# Patient Record
Sex: MALE | Race: ASIAN | NOT HISPANIC OR LATINO | URBAN - METROPOLITAN AREA
[De-identification: names, ages, dates, MRNs, and addresses within clinical notes are randomized per-mention and may not be internally consistent; named-entity substitution may affect disease eponyms.]

---

## 2019-06-26 PROBLEM — Z00.00 ENCOUNTER FOR PREVENTIVE HEALTH EXAMINATION: Status: ACTIVE | Noted: 2019-06-26

## 2019-07-15 ENCOUNTER — OUTPATIENT (OUTPATIENT)
Dept: OUTPATIENT SERVICES | Facility: HOSPITAL | Age: 64
LOS: 1 days | End: 2019-07-15

## 2019-07-15 ENCOUNTER — APPOINTMENT (OUTPATIENT)
Dept: CV DIAGNOSITCS | Facility: HOSPITAL | Age: 64
End: 2019-07-15
Payer: MEDICAID

## 2019-07-15 DIAGNOSIS — I35.9 NONRHEUMATIC AORTIC VALVE DISORDER, UNSPECIFIED: ICD-10-CM

## 2019-07-15 PROCEDURE — 93312 ECHO TRANSESOPHAGEAL: CPT | Mod: 26

## 2019-07-15 PROCEDURE — 93320 DOPPLER ECHO COMPLETE: CPT | Mod: 26,GC

## 2019-07-15 PROCEDURE — 93325 DOPPLER ECHO COLOR FLOW MAPG: CPT | Mod: 26,GC

## 2019-07-15 RX ORDER — SODIUM CHLORIDE 9 MG/ML
3 INJECTION INTRAMUSCULAR; INTRAVENOUS; SUBCUTANEOUS ONCE
Refills: 0 | Status: DISCONTINUED | OUTPATIENT
Start: 2019-07-15 | End: 2019-07-30

## 2019-07-18 PROBLEM — Z86.73 HISTORY OF CEREBROVASCULAR ACCIDENT: Status: RESOLVED | Noted: 2019-07-18 | Resolved: 2019-07-18

## 2019-07-18 PROBLEM — F17.200 CURRENT EVERY DAY SMOKER: Status: ACTIVE | Noted: 2019-07-18

## 2019-07-18 PROBLEM — R01.1 HEART MURMUR: Status: ACTIVE | Noted: 2019-07-18

## 2019-07-18 RX ORDER — ACETAMINOPHEN 325 MG/1
325 TABLET ORAL EVERY 6 HOURS
Refills: 0 | Status: ACTIVE | COMMUNITY
Start: 2019-07-18

## 2019-07-18 RX ORDER — METOPROLOL SUCCINATE 25 MG/1
25 TABLET, EXTENDED RELEASE ORAL DAILY
Qty: 90 | Refills: 0 | Status: ACTIVE | COMMUNITY
Start: 2019-07-18

## 2019-07-18 RX ORDER — OMEPRAZOLE 20 MG/1
20 CAPSULE, DELAYED RELEASE ORAL
Refills: 0 | Status: ACTIVE | COMMUNITY
Start: 2019-07-18

## 2019-07-18 RX ORDER — PROMETHAZINE HYDROCHLORIDE AND DEXTROMETHORPHAN HYDROBROMIDE ORAL SOLUTION 15; 6.25 MG/5ML; MG/5ML
6.25-15 SOLUTION ORAL
Qty: 240 | Refills: 2 | Status: ACTIVE | COMMUNITY
Start: 2019-07-18

## 2019-07-22 ENCOUNTER — APPOINTMENT (OUTPATIENT)
Dept: CARDIOTHORACIC SURGERY | Facility: CLINIC | Age: 64
End: 2019-07-22
Payer: COMMERCIAL

## 2019-07-22 ENCOUNTER — NON-APPOINTMENT (OUTPATIENT)
Age: 64
End: 2019-07-22

## 2019-07-22 VITALS
HEIGHT: 66 IN | OXYGEN SATURATION: 98 % | SYSTOLIC BLOOD PRESSURE: 127 MMHG | WEIGHT: 160 LBS | TEMPERATURE: 98.2 F | RESPIRATION RATE: 16 BRPM | BODY MASS INDEX: 25.71 KG/M2 | HEART RATE: 60 BPM | DIASTOLIC BLOOD PRESSURE: 73 MMHG

## 2019-07-22 DIAGNOSIS — F17.200 NICOTINE DEPENDENCE, UNSPECIFIED, UNCOMPLICATED: ICD-10-CM

## 2019-07-22 DIAGNOSIS — I35.1 NONRHEUMATIC AORTIC (VALVE) INSUFFICIENCY: ICD-10-CM

## 2019-07-22 DIAGNOSIS — R01.1 CARDIAC MURMUR, UNSPECIFIED: ICD-10-CM

## 2019-07-22 DIAGNOSIS — I35.2 NONRHEUMATIC AORTIC (VALVE) STENOSIS WITH INSUFFICIENCY: ICD-10-CM

## 2019-07-22 DIAGNOSIS — Q23.1 CONGENITAL STENOSIS OF AORTIC VALVE: ICD-10-CM

## 2019-07-22 DIAGNOSIS — Z86.73 PERSONAL HISTORY OF TRANSIENT ISCHEMIC ATTACK (TIA), AND CEREBRAL INFARCTION W/OUT RESIDUAL DEFICITS: ICD-10-CM

## 2019-07-22 DIAGNOSIS — I10 ESSENTIAL (PRIMARY) HYPERTENSION: ICD-10-CM

## 2019-07-22 DIAGNOSIS — I63.9 CEREBRAL INFARCTION, UNSPECIFIED: ICD-10-CM

## 2019-07-22 DIAGNOSIS — I35.8 OTHER NONRHEUMATIC AORTIC VALVE DISORDERS: ICD-10-CM

## 2019-07-22 DIAGNOSIS — Q23.0 CONGENITAL STENOSIS OF AORTIC VALVE: ICD-10-CM

## 2019-07-22 PROCEDURE — ZZZZZ: CPT

## 2019-07-22 PROCEDURE — 99205 OFFICE O/P NEW HI 60 MIN: CPT

## 2019-07-22 PROCEDURE — 93000 ELECTROCARDIOGRAM COMPLETE: CPT

## 2019-07-22 NOTE — PHYSICAL EXAM
[Normal Appearance] : normal appearance [General Appearance - Well Developed] : well developed [General Appearance - Well Nourished] : well nourished [Well Groomed] : well groomed [Eyelids - No Xanthelasma] : the eyelids demonstrated no xanthelasmas [General Appearance - In No Acute Distress] : no acute distress [No Oral Cyanosis] : no oral cyanosis [No Oral Pallor] : no oral pallor [Normal Jugular Venous V Waves Present] : normal jugular venous V waves present [Normal Rate] : normal [No Jugular Venous Graham A Waves] : no jugular venous graham A waves [IV] : a grade 4 [Rhythm Regular] : regular [II] : a grade 2 [No Pitting Edema] : no pitting edema present [Exaggerated Use Of Accessory Muscles For Inspiration] : no accessory muscle use [Respiration, Rhythm And Depth] : normal respiratory rhythm and effort [Bowel Sounds] : normal bowel sounds [Abdomen Soft] : soft [Abdomen Tenderness] : non-tender [Abnormal Walk] : normal gait [Nail Clubbing] : no clubbing of the fingernails [Cyanosis, Localized] : no localized cyanosis [Petechial Hemorrhages (___cm)] : no petechial hemorrhages [] : no rash [Skin Color & Pigmentation] : normal skin color and pigmentation [No Venous Stasis] : no venous stasis [Oriented To Time, Place, And Person] : oriented to person, place, and time [Affect] : the affect was normal [No Anxiety] : not feeling anxious [FreeTextEntry1] : via  [Mood] : the mood was normal

## 2019-07-22 NOTE — DISCUSSION/SUMMARY
[Deteriorating] : deteriorating [Aortic Stenosis] : aortic stenosis [Cardiac Catheterization] : cardiac catheterization [Coronary Angiography] : coronary angiography [Cardiothoracic Surgery Consult] : cardiothoracic surgery consultation [Patient] : the patient [None] : none [Aortic Valve Replacement] : aortic valve replacement [FreeTextEntry1] : Mr Torres has a bicuspid aortic valve (as reported on a recent JOSSELYN) with severe stenosis and insufficiency and recently progressive exertional dyspnea (NYHA II).  He is referred for evaluation of treatment options, specifically TAVR vs. SAVR, however as I have explained to him and his family in extensive detail he would not be a candidate for the LOW RISK BICUSPID TAVR study--the lower limit inclusion for age is 65.  I am recommending he see Dr Stephenson to discuss consideration for SAVR given his symptoms.  He would like to proceed with scheduling his right and left cardiac catheterization, which will be scheduled.  Given his history of HTN (though well controlled), AS/AI, and LVH, I would recommend consideration for adding a low dose ACE or ARB--which they will discuss with Dr Turcios.  I would also recommend a baseline Pulmonary evaluation given his 40+ pack year smoking history. [Family] : the patient's family

## 2019-07-22 NOTE — HISTORY OF PRESENT ILLNESS
[FreeTextEntry1] : Mr Torres is referred by Dr Turcios for evaluation of a bicuspid aortic valve with mixed disease, both stenosis and insufficiency.  His native language is Greek and he is accompanied by his son and daughter in law.  The AS/AI was just recently diagnosed after he was seen by Dr Turcios for initial consultation (he was taking care of his wife, and the family wanted him to be seen, but were unaware of any prior cardiac issues).  He does have some fatigue, exertional dyspnea, and a decline in functional capacity.  He had a stroke in [facility] a year ago that they attributed to him missing his HTN medications, working, and was severely hypertensive at that time.  He presented with acute visual loss which has since resolved.  He has no angina or syncopal symptoms.  He is treated for HTN and HLD (but does not know his precise medications).  He is a current half-pack a day smoker for the past 40 years; he has never seen a Pulmonologist.

## 2019-07-22 NOTE — REVIEW OF SYSTEMS
[Chills] : no chills [Feeling Fatigued] : feeling fatigued [Fever] : no fever [Eyeglasses] : currently wearing eyeglasses [Dyspnea on exertion] : dyspnea during exertion [Palpitations] : no palpitations [Chest Pain] : no chest pain [see HPI] : see HPI [Cough] : cough [Negative] : Heme/Lymph

## 2019-07-23 NOTE — PHYSICAL EXAM
[General Appearance - Well Developed] : well developed [General Appearance - Well Nourished] : well nourished [General Appearance - Alert] : alert [General Appearance - In No Acute Distress] : in no acute distress [Sclera] : the sclera and conjunctiva were normal [PERRL With Normal Accommodation] : pupils were equal in size, round, and reactive to light [Hearing Threshold Finger Rub Not Winn] : hearing was normal [Both Tympanic Membranes Were Examined] : both tympanic membranes were normal [Outer Ear] : the ears and nose were normal in appearance [Neck Appearance] : the appearance of the neck was normal [] : no respiratory distress [Respiration, Rhythm And Depth] : normal respiratory rhythm and effort [Heart Rate And Rhythm] : heart rate was normal and rhythm regular [Apical Impulse] : the apical impulse was normal [Auscultation Breath Sounds / Voice Sounds] : lungs were clear to auscultation bilaterally [Systolic grade ___/6] : A grade [unfilled]/6 systolic murmur was heard. [Heart Sounds] : normal S1 and S2 [Examination Of The Chest] : the chest was normal in appearance [2+] : left 2+ [Breast Appearance] : normal in appearance [Bowel Sounds] : normal bowel sounds [Abdomen Soft] : soft [Abnormal Walk] : normal gait [No CVA Tenderness] : no ~M costovertebral angle tenderness [Involuntary Movements] : no involuntary movements were seen [Skin Turgor] : normal skin turgor [Skin Color & Pigmentation] : normal skin color and pigmentation [Oriented To Time, Place, And Person] : oriented to person, place, and time [Impaired Insight] : insight and judgment were intact [No Focal Deficits] : no focal deficits [Affect] : the affect was normal [Mood] : the mood was normal [Memory Recent] : recent memory was not impaired [Memory Remote] : remote memory was not impaired [FreeTextEntry1] : Deferred

## 2019-07-23 NOTE — DATA REVIEWED
[FreeTextEntry1] : 7/15/19 JOSSELYN revealed Mild mitral Regurgitation. Calcified bicuspid aortic valve with decreased opening. RT and LT coronary cusp are fused. Peak trans aortic valve gradient 59 mm hg , mean trans aortic gradient 31 mm hg, estimated valve area 0.8 cm2 , consistent with severe aortic stenosis. Severe aortic regurgitation. \par \par 6/25/19 ECHO revealed Mild mitral Regurgitation. Severe aortic stenosis. Mild to moderate aortic regurgitation . Left ventricular diastolic dysfunction.

## 2019-07-23 NOTE — CONSULT LETTER
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 \par \par We take a multidisciplinary team approach to patient care and consider you, the referring physician, an extension of our team. We will maintain an open line of communication with you throughout your patient's treatment course. \par \par He  is being evaluated for Aortic stenosis. I have reviewed all of the patient's medical records and diagnostic images at the time of his  office consultation. I have enclosed a copy for your records. \par \par 7/15/19 JOSSELYN revealed Mild mitral Regurgitation. Calcified bicuspid aortic valve with decreased opening. RT and LT coronary cusp are fused. Peak trans aortic valve gradient 59 mm hg , mean trans aortic gradient 31 mm hg, estimated valve area 0.8 cm2 , consistent with severe aortic stenosis. Severe aortic regurgitation. \par \par 6/25/19 ECHO revealed Mild mitral Regurgitation. Severe aortic stenosis. Mild to moderate aortic regurgitation . Left ventricular diastolic dysfunction. \par \par I have reviewed the indications for surgery and anatomy of the heart. The patient meets criteria for surgery. I have recommended that the patient is a candidate for a Aortic valve Replacement  . \par \par  I will update you on his perioperative status and disposition upon discharge. \par \par I appreciate the opportunity to care for your patient at the  [facility] based at [place]. If there are any questions or concerns, please call me  at [phone].\par \par \par Sincerely, \par \par \par \par \par Peter Stephenson MD\par Director\par [facility]\par Professor \par Cardiovascular & Thoracic Surgery\par [employer] [employer].\par \par \par [facility]:\par Department of Cardiovascular and Thoracic Surgery\[address], [address]\par Office: [phone]\par Fax: [phone]\par \par [facility]\par  \par Department of Cardiovascular and Thoracic Surgery\[address], [address]\par Phone: [phone]\par Office: [phone]\par \par \par \par \par \par

## 2019-07-23 NOTE — HISTORY OF PRESENT ILLNESS
[FreeTextEntry1] : This is a 64 year old male , Serbian speaking with past medical history of Heart murmur, Current smoker ( 0.5 pack/40 yrs) , CVA 2017( no residuals ), Hypertension and Severe Aortic stenosis with complaints of Dyspnea on Exertion for 2 years ( NYHA II ) which is progressively getting worse and occasional palpitations. He went for a routine physical check with the Cardiologist and ECHO revealed Mild mitral Regurgitation. Severe aortic stenosis. Mild to moderate aortic regurgitation . Left ventricular diastolic dysfunction. 7/15/19 JOSSELYN revealed Mild mitral Regurgitation. Calcified bicuspid aortic valve with decreased opening. RT and LT coronary cusp are fused. Peak trans aortic valve gradient 59 mm hg , mean trans aortic gradient 31 mm hg, estimated valve area 0.8 cm2 , consistent with severe aortic stenosis. Severe aortic regurgitation. He is here for surgical consultation and management for Aortic stenosis. Denies any chest pain, dizziness or pedal edema. \par \par

## 2019-07-23 NOTE — ASSESSMENT
[FreeTextEntry1] : This is a 64 year old male , Danish speaking with past medical history of Heart murmur, Current smoker ( 0.5 pack/40 yrs) , CVA 2017( no residuals ), Hypertension and Severe Aortic stenosis with complaints of Dyspnea on Exertion for 2 years ( NYHA II ) which is progressively getting worse and occasional palpitations. He went for a routine physical check with the Cardiologist and ECHO revealed Mild mitral Regurgitation. Severe aortic stenosis. Mild to moderate aortic regurgitation . Left ventricular diastolic dysfunction. 7/15/19 JOSSELYN revealed Mild mitral Regurgitation. Calcified bicuspid aortic valve with decreased opening. RT and LT coronary cusp are fused. Peak trans aortic valve gradient 59 mm hg , mean trans aortic gradient 31 mm hg, estimated valve area 0.8 cm2 , consistent with severe aortic stenosis. Severe aortic regurgitation. He is here for surgical consultation and management for Aortic stenosis. Denies any chest pain, dizziness or pedal edema. \par \par \par  I reviewed the cardiac imaging, medical records and reports with patient and discussed the case. 7/15/19 JOSSELYN revealed Mild mitral Regurgitation. Calcified bicuspid aortic valve with decreased opening. RT and LT coronary cusp are fused. Peak trans aortic valve gradient 59 mm hg , mean trans aortic gradient 31 mm hg, estimated valve area 0.8 cm2 , consistent with severe aortic stenosis. Severe aortic regurgitation. \par \par 6/25/19 ECHO revealed Mild mitral Regurgitation. Severe aortic stenosis. Mild to moderate aortic regurgitation . Left ventricular diastolic dysfunction. \par \par  I discussed the risks , benefits and alternatives to surgery. Risks included but not limited to  bleeding , stroke, Myocardial Infarction, kidney problems,Blood transfusion ,permanent  pacemaker implantation,  infections and death. I  quoted an  operative mortality and complication risks as Low . I also discussed the various approaches in detail.I  feel that the patient will benefit and is a candidate for a Aortic valve Replacement  . All questions and concerns were addressed and patient will decide and call back with decision. \par \par \par Plan:\par 1) Aortic valve Replacement  \par 2) Cardiac Catherization to evaluate coronary arteries\par 3) May return to clinic on PRN basis\par

## 2019-07-23 NOTE — REVIEW OF SYSTEMS
[Palpitations] : palpitations [SOB on Exertion] : shortness of breath during exertion [Negative] : Heme/Lymph [Fever] : no fever [Chest Pain] : no chest pain [Chills] : no chills [Lower Ext Edema] : no extremity edema [Dizziness] : no dizziness [Fainting] : no fainting [Easy Bleeding] : no tendency for easy bleeding [Easy Bruising] : no tendency for easy bruising

## 2019-07-29 ENCOUNTER — OUTPATIENT (OUTPATIENT)
Dept: OUTPATIENT SERVICES | Facility: HOSPITAL | Age: 64
LOS: 1 days | End: 2019-07-29
Payer: MEDICAID

## 2019-07-29 VITALS
TEMPERATURE: 98 F | DIASTOLIC BLOOD PRESSURE: 72 MMHG | WEIGHT: 160.06 LBS | SYSTOLIC BLOOD PRESSURE: 175 MMHG | RESPIRATION RATE: 18 BRPM | HEART RATE: 58 BPM | HEIGHT: 65.5 IN | OXYGEN SATURATION: 99 %

## 2019-07-29 DIAGNOSIS — I35.1 NONRHEUMATIC AORTIC (VALVE) INSUFFICIENCY: ICD-10-CM

## 2019-07-29 LAB
ALBUMIN SERPL ELPH-MCNC: 4.9 G/DL — SIGNIFICANT CHANGE UP (ref 3.3–5)
ALP SERPL-CCNC: 140 U/L — HIGH (ref 40–120)
ALT FLD-CCNC: 18 U/L — SIGNIFICANT CHANGE UP (ref 10–45)
ANION GAP SERPL CALC-SCNC: 11 MMOL/L — SIGNIFICANT CHANGE UP (ref 5–17)
AST SERPL-CCNC: 19 U/L — SIGNIFICANT CHANGE UP (ref 10–40)
BILIRUB SERPL-MCNC: 0.3 MG/DL — SIGNIFICANT CHANGE UP (ref 0.2–1.2)
BUN SERPL-MCNC: 20 MG/DL — SIGNIFICANT CHANGE UP (ref 7–23)
CALCIUM SERPL-MCNC: 9.4 MG/DL — SIGNIFICANT CHANGE UP (ref 8.4–10.5)
CHLORIDE SERPL-SCNC: 102 MMOL/L — SIGNIFICANT CHANGE UP (ref 96–108)
CO2 SERPL-SCNC: 26 MMOL/L — SIGNIFICANT CHANGE UP (ref 22–31)
CREAT SERPL-MCNC: 1.14 MG/DL — SIGNIFICANT CHANGE UP (ref 0.5–1.3)
GLUCOSE SERPL-MCNC: 103 MG/DL — HIGH (ref 70–99)
HCT VFR BLD CALC: 40.3 % — SIGNIFICANT CHANGE UP (ref 39–50)
HGB BLD-MCNC: 14.1 G/DL — SIGNIFICANT CHANGE UP (ref 13–17)
MCHC RBC-ENTMCNC: 29.4 PG — SIGNIFICANT CHANGE UP (ref 27–34)
MCHC RBC-ENTMCNC: 35 GM/DL — SIGNIFICANT CHANGE UP (ref 32–36)
MCV RBC AUTO: 84 FL — SIGNIFICANT CHANGE UP (ref 80–100)
PLATELET # BLD AUTO: 272 K/UL — SIGNIFICANT CHANGE UP (ref 150–400)
POTASSIUM SERPL-MCNC: 4.6 MMOL/L — SIGNIFICANT CHANGE UP (ref 3.5–5.3)
POTASSIUM SERPL-SCNC: 4.6 MMOL/L — SIGNIFICANT CHANGE UP (ref 3.5–5.3)
PROT SERPL-MCNC: 8.2 G/DL — SIGNIFICANT CHANGE UP (ref 6–8.3)
RBC # BLD: 4.8 M/UL — SIGNIFICANT CHANGE UP (ref 4.2–5.8)
RBC # FLD: 12.8 % — SIGNIFICANT CHANGE UP (ref 10.3–14.5)
SODIUM SERPL-SCNC: 139 MMOL/L — SIGNIFICANT CHANGE UP (ref 135–145)
WBC # BLD: 5.8 K/UL — SIGNIFICANT CHANGE UP (ref 3.8–10.5)
WBC # FLD AUTO: 5.8 K/UL — SIGNIFICANT CHANGE UP (ref 3.8–10.5)

## 2019-07-29 PROCEDURE — 93456 R HRT CORONARY ARTERY ANGIO: CPT | Mod: 26,GC

## 2019-07-29 PROCEDURE — C1887: CPT

## 2019-07-29 PROCEDURE — 85027 COMPLETE CBC AUTOMATED: CPT

## 2019-07-29 PROCEDURE — 93005 ELECTROCARDIOGRAM TRACING: CPT

## 2019-07-29 PROCEDURE — 93010 ELECTROCARDIOGRAM REPORT: CPT

## 2019-07-29 PROCEDURE — C1894: CPT

## 2019-07-29 PROCEDURE — C1769: CPT

## 2019-07-29 PROCEDURE — 93456 R HRT CORONARY ARTERY ANGIO: CPT

## 2019-07-29 PROCEDURE — 80053 COMPREHEN METABOLIC PANEL: CPT

## 2019-07-29 RX ORDER — SODIUM CHLORIDE 9 MG/ML
3 INJECTION INTRAMUSCULAR; INTRAVENOUS; SUBCUTANEOUS EVERY 8 HOURS
Refills: 0 | Status: DISCONTINUED | OUTPATIENT
Start: 2019-07-29 | End: 2019-08-15

## 2019-07-29 RX ORDER — LOSARTAN/HYDROCHLOROTHIAZIDE 100MG-25MG
1 TABLET ORAL
Qty: 0 | Refills: 0 | DISCHARGE

## 2019-07-29 RX ORDER — METOPROLOL TARTRATE 50 MG
1 TABLET ORAL
Qty: 0 | Refills: 0 | DISCHARGE

## 2019-07-29 RX ORDER — OMEPRAZOLE 10 MG/1
1 CAPSULE, DELAYED RELEASE ORAL
Qty: 0 | Refills: 0 | DISCHARGE

## 2019-07-29 RX ORDER — GEMFIBROZIL 600 MG
1 TABLET ORAL
Qty: 0 | Refills: 0 | DISCHARGE

## 2019-07-29 RX ORDER — FOLIC ACID 0.8 MG
1 TABLET ORAL
Qty: 0 | Refills: 0 | DISCHARGE

## 2019-07-29 NOTE — H&P CARDIOLOGY - PMH
Aortic stenosis    CVA (cerebral vascular accident)  in [facility], 2017, no residual  Heart murmur    HLD (hyperlipidemia)    HTN (hypertension)    Smoker  over 40 years

## 2019-07-29 NOTE — H&P CARDIOLOGY - HISTORY OF PRESENT ILLNESS
63 yo male no implantable device speak Bagali (requesting his son MD Hoffmann to translate) with PMHx of HTN, HLD, CVA in 2017 in [place] with no residual, current smoker>40 years presented for cardiac cath. pt reports he was evaluated by Dr. Turcios for regular check up (his wife was seen by Dr. Turcios recently) found to have heart murmur with Aortic Stenosis and insufficiency by Echo. Pt did not aware of aortic valve disease in the past. Pt was re evaluated by Dr. Hobbs and referred for RHC/LHC. Pt reports he gets SOB on when he was walking up stairs.      Echo: 7/15/19: Calcified bicuspid AV, mean AVg 31mmHg, NYDIA 0.8, severe AI, concentric LVH, normal RV function , TTE (Nov 2018): LVIDd/s 48/30mm, mild mitral regurgitation LVEF 71%.

## 2019-07-29 NOTE — H&P CARDIOLOGY - EKG AND INTERPRETATION
SB @ 58, left axis deviation, ST abnormality in II, III, aVF, V 1-4, V5-6, T wave abnormality in I, II, aVF, V 5-6